# Patient Record
Sex: FEMALE | Race: WHITE
[De-identification: names, ages, dates, MRNs, and addresses within clinical notes are randomized per-mention and may not be internally consistent; named-entity substitution may affect disease eponyms.]

---

## 2018-01-07 ENCOUNTER — HEALTH MAINTENANCE LETTER (OUTPATIENT)
Age: 38
End: 2018-01-07

## 2018-02-05 ENCOUNTER — RECORDS - HEALTHEAST (OUTPATIENT)
Dept: ADMINISTRATIVE | Facility: OTHER | Age: 38
End: 2018-02-05

## 2018-02-28 ENCOUNTER — APPOINTMENT (OUTPATIENT)
Dept: CT IMAGING | Facility: CLINIC | Age: 38
End: 2018-02-28
Attending: FAMILY MEDICINE
Payer: COMMERCIAL

## 2018-02-28 ENCOUNTER — HOSPITAL ENCOUNTER (EMERGENCY)
Facility: CLINIC | Age: 38
Discharge: HOME | End: 2018-02-28
Attending: FAMILY MEDICINE | Admitting: FAMILY MEDICINE
Payer: COMMERCIAL

## 2018-02-28 VITALS
RESPIRATION RATE: 16 BRPM | HEIGHT: 71 IN | SYSTOLIC BLOOD PRESSURE: 103 MMHG | HEART RATE: 66 BPM | OXYGEN SATURATION: 100 % | DIASTOLIC BLOOD PRESSURE: 66 MMHG | TEMPERATURE: 98.5 F

## 2018-02-28 DIAGNOSIS — R91.1 PULMONARY NODULE: ICD-10-CM

## 2018-02-28 DIAGNOSIS — R10.9 ABDOMINAL PAIN, UNSPECIFIED ABDOMINAL LOCATION: ICD-10-CM

## 2018-02-28 DIAGNOSIS — R11.0 NAUSEA: ICD-10-CM

## 2018-02-28 LAB
ALBUMIN SERPL-MCNC: 4 G/DL (ref 3.4–5)
ALBUMIN UR-MCNC: NEGATIVE MG/DL
ALP SERPL-CCNC: 42 U/L (ref 40–150)
ALT SERPL W P-5'-P-CCNC: 15 U/L (ref 0–50)
ANION GAP SERPL CALCULATED.3IONS-SCNC: 8 MMOL/L (ref 3–14)
APPEARANCE UR: CLEAR
AST SERPL W P-5'-P-CCNC: 17 U/L (ref 0–45)
BASOPHILS # BLD AUTO: 0 10E9/L (ref 0–0.2)
BASOPHILS NFR BLD AUTO: 0.5 %
BILIRUB SERPL-MCNC: 0.6 MG/DL (ref 0.2–1.3)
BILIRUB UR QL STRIP: NEGATIVE
BUN SERPL-MCNC: 11 MG/DL (ref 7–30)
CALCIUM SERPL-MCNC: 8.6 MG/DL (ref 8.5–10.1)
CHLORIDE SERPL-SCNC: 107 MMOL/L (ref 94–109)
CO2 SERPL-SCNC: 23 MMOL/L (ref 20–32)
COLOR UR AUTO: YELLOW
CREAT SERPL-MCNC: 0.88 MG/DL (ref 0.52–1.04)
DIFFERENTIAL METHOD BLD: NORMAL
EOSINOPHIL # BLD AUTO: 0.2 10E9/L (ref 0–0.7)
EOSINOPHIL NFR BLD AUTO: 3.7 %
ERYTHROCYTE [DISTWIDTH] IN BLOOD BY AUTOMATED COUNT: 12.5 % (ref 10–15)
GFR SERPL CREATININE-BSD FRML MDRD: 71 ML/MIN/1.7M2
GLUCOSE SERPL-MCNC: 81 MG/DL (ref 70–99)
GLUCOSE UR STRIP-MCNC: NEGATIVE MG/DL
HCT VFR BLD AUTO: 38.6 % (ref 35–47)
HGB BLD-MCNC: 13.3 G/DL (ref 11.7–15.7)
HGB UR QL STRIP: ABNORMAL
IMM GRANULOCYTES # BLD: 0 10E9/L (ref 0–0.4)
IMM GRANULOCYTES NFR BLD: 0.2 %
KETONES UR STRIP-MCNC: 20 MG/DL
LEUKOCYTE ESTERASE UR QL STRIP: NEGATIVE
LIPASE SERPL-CCNC: 157 U/L (ref 73–393)
LYMPHOCYTES # BLD AUTO: 2.3 10E9/L (ref 0.8–5.3)
LYMPHOCYTES NFR BLD AUTO: 38.6 %
MCH RBC QN AUTO: 29.8 PG (ref 26.5–33)
MCHC RBC AUTO-ENTMCNC: 34.5 G/DL (ref 31.5–36.5)
MCV RBC AUTO: 86 FL (ref 78–100)
MONOCYTES # BLD AUTO: 0.4 10E9/L (ref 0–1.3)
MONOCYTES NFR BLD AUTO: 6.8 %
MUCOUS THREADS #/AREA URNS LPF: PRESENT /LPF
NEUTROPHILS # BLD AUTO: 3 10E9/L (ref 1.6–8.3)
NEUTROPHILS NFR BLD AUTO: 50.2 %
NITRATE UR QL: NEGATIVE
PH UR STRIP: 5 PH (ref 5–7)
PLATELET # BLD AUTO: 245 10E9/L (ref 150–450)
POTASSIUM SERPL-SCNC: 3.4 MMOL/L (ref 3.4–5.3)
PROT SERPL-MCNC: 7.6 G/DL (ref 6.8–8.8)
RBC # BLD AUTO: 4.47 10E12/L (ref 3.8–5.2)
RBC #/AREA URNS AUTO: 3 /HPF (ref 0–2)
SODIUM SERPL-SCNC: 138 MMOL/L (ref 133–144)
SOURCE: ABNORMAL
SP GR UR STRIP: 1.02 (ref 1–1.03)
SQUAMOUS #/AREA URNS AUTO: 4 /HPF (ref 0–1)
UROBILINOGEN UR STRIP-MCNC: 0 MG/DL (ref 0–2)
WBC # BLD AUTO: 6 10E9/L (ref 4–11)
WBC #/AREA URNS AUTO: 3 /HPF (ref 0–2)

## 2018-02-28 PROCEDURE — 99285 EMERGENCY DEPT VISIT HI MDM: CPT | Mod: 25 | Performed by: FAMILY MEDICINE

## 2018-02-28 PROCEDURE — 25000128 H RX IP 250 OP 636: Performed by: FAMILY MEDICINE

## 2018-02-28 PROCEDURE — 99285 EMERGENCY DEPT VISIT HI MDM: CPT | Mod: 25

## 2018-02-28 PROCEDURE — 85025 COMPLETE CBC W/AUTO DIFF WBC: CPT | Performed by: FAMILY MEDICINE

## 2018-02-28 PROCEDURE — 93005 ELECTROCARDIOGRAM TRACING: CPT

## 2018-02-28 PROCEDURE — 87086 URINE CULTURE/COLONY COUNT: CPT | Performed by: FAMILY MEDICINE

## 2018-02-28 PROCEDURE — 81001 URINALYSIS AUTO W/SCOPE: CPT | Performed by: FAMILY MEDICINE

## 2018-02-28 PROCEDURE — 74177 CT ABD & PELVIS W/CONTRAST: CPT

## 2018-02-28 PROCEDURE — 25000125 ZZHC RX 250: Performed by: FAMILY MEDICINE

## 2018-02-28 PROCEDURE — 80053 COMPREHEN METABOLIC PANEL: CPT | Performed by: FAMILY MEDICINE

## 2018-02-28 PROCEDURE — 93010 ELECTROCARDIOGRAM REPORT: CPT | Mod: Z6 | Performed by: FAMILY MEDICINE

## 2018-02-28 PROCEDURE — 83690 ASSAY OF LIPASE: CPT | Performed by: FAMILY MEDICINE

## 2018-02-28 RX ORDER — IOPAMIDOL 755 MG/ML
100 INJECTION, SOLUTION INTRAVASCULAR ONCE
Status: COMPLETED | OUTPATIENT
Start: 2018-02-28 | End: 2018-02-28

## 2018-02-28 RX ADMIN — SODIUM CHLORIDE 60 ML: 9 INJECTION, SOLUTION INTRAVENOUS at 21:17

## 2018-02-28 RX ADMIN — IOPAMIDOL 80 ML: 755 INJECTION, SOLUTION INTRAVENOUS at 21:17

## 2018-02-28 NOTE — ED AVS SNAPSHOT
[facility] Emergency Department    [address] [address]    Phone:  [phone]    Fax:  [phone]                                       Brenda Bundy   MRN: [id number]    Department:  [facility] Emergency Department   Date of Visit:  2/28/2018           Patient Information     Date Of Birth          1980        Your diagnoses for this visit were:     Abdominal pain, unspecified abdominal location epigastric, luq, ruq described    Nausea     Pulmonary nodule        You were seen by Adonay Melendez MD.        Discharge Instructions       Return to the Emergency Room if the following occurs:     Worsened pain, fever >101, vomiting/dehydration, or for any concern at anytime.    Or, follow-up with the following provider as we discussed:     Return to your GI doctor as scheduled.  Complete the HIDA as scheduled.  Talk to your doctor about the pulmonary nodule.    Medications discussed:    None new.  No changes.    If you received pain-relieving or sedating medication during your time in the ER, avoid alcohol, driving automobiles, or working with machinery.  Also, a responsible adult must stay with you.        Call the Nurse Advice Line at [phone] or [phone] for any concern at anytime.      24 Hour Appointment Hotline       To make an appointment at any [place] clinic, call [phone] ([phone]). If you don't have a family doctor or clinic, we will help you find one. [place] clinics are conveniently located to serve the needs of you and your family.             Review of your medicines      Notice     You have not been prescribed any medications.            Procedures and tests performed during your visit     Abd/pelvis CT, IV contrast only TRAUMA  / AAA    CBC with platelets, differential    Comprehensive metabolic panel    EKG 12-lead, tracing only    Lipase    Peripheral IV catheter    UA with Microscopic reflex to Culture    Urine Culture Aerobic  Bacterial      Orders Needing Specimen Collection     None      Pending Results     Date and Time Order Name Status Description    2/28/2018 2219 Urine Culture Aerobic Bacterial In process             Pending Culture Results     Date and Time Order Name Status Description    2/28/2018 2219 Urine Culture Aerobic Bacterial In process             Pending Results Instructions     If you had any lab results that were not finalized at the time of your Discharge, you can call the ED Lab Result RN at [phone]. You will be contacted by this team for any positive Lab results or changes in treatment. The nurses are available 7 days a week from 10A to 6:30P.  You can leave a message 24 hours per day and they will return your call.        Test Results From Your Hospital Stay        2/28/2018  8:32 PM      Component Results     Component Value Ref Range & Units Status    WBC 6.0 4.0 - 11.0 10e9/L Final    RBC Count 4.47 3.8 - 5.2 10e12/L Final    Hemoglobin 13.3 11.7 - 15.7 g/dL Final    Hematocrit 38.6 35.0 - 47.0 % Final    MCV 86 78 - 100 fl Final    MCH 29.8 26.5 - 33.0 pg Final    MCHC 34.5 31.5 - 36.5 g/dL Final    RDW 12.5 10.0 - 15.0 % Final    Platelet Count 245 150 - 450 10e9/L Final    Diff Method Automated Method  Final    % Neutrophils 50.2 % Final    % Lymphocytes 38.6 % Final    % Monocytes 6.8 % Final    % Eosinophils 3.7 % Final    % Basophils 0.5 % Final    % Immature Granulocytes 0.2 % Final    Absolute Neutrophil 3.0 1.6 - 8.3 10e9/L Final    Absolute Lymphocytes 2.3 0.8 - 5.3 10e9/L Final    Absolute Monocytes 0.4 0.0 - 1.3 10e9/L Final    Absolute Eosinophils 0.2 0.0 - 0.7 10e9/L Final    Absolute Basophils 0.0 0.0 - 0.2 10e9/L Final    Abs Immature Granulocytes 0.0 0 - 0.4 10e9/L Final         2/28/2018  8:44 PM      Component Results     Component Value Ref Range & Units Status    Sodium 138 133 - 144 mmol/L Final    Potassium 3.4 3.4 - 5.3 mmol/L Final    Chloride 107 94 - 109 mmol/L Final    Carbon  Dioxide 23 20 - 32 mmol/L Final    Anion Gap 8 3 - 14 mmol/L Final    Glucose 81 70 - 99 mg/dL Final    Urea Nitrogen 11 7 - 30 mg/dL Final    Creatinine 0.88 0.52 - 1.04 mg/dL Final    GFR Estimate 71 >60 mL/min/1.7m2 Final    Non  GFR Calc    GFR Estimate If Black 86 >60 mL/min/1.7m2 Final    African American GFR Calc    Calcium 8.6 8.5 - 10.1 mg/dL Final    Bilirubin Total 0.6 0.2 - 1.3 mg/dL Final    Albumin 4.0 3.4 - 5.0 g/dL Final    Protein Total 7.6 6.8 - 8.8 g/dL Final    Alkaline Phosphatase 42 40 - 150 U/L Final    ALT 15 0 - 50 U/L Final    AST 17 0 - 45 U/L Final         2/28/2018  8:42 PM      Component Results     Component Value Ref Range & Units Status    Lipase 157 73 - 393 U/L Final         2/28/2018  9:26 PM      Component Results     Component Value Ref Range & Units Status    Color Urine Yellow  Final    Appearance Urine Clear  Final    Glucose Urine Negative NEG^Negative mg/dL Final    Bilirubin Urine Negative NEG^Negative Final    Ketones Urine 20 (A) NEG^Negative mg/dL Final    Specific Gravity Urine 1.020 1.003 - 1.035 Final    Blood Urine Moderate (A) NEG^Negative Final    pH Urine 5.0 5.0 - 7.0 pH Final    Protein Albumin Urine Negative NEG^Negative mg/dL Final    Urobilinogen mg/dL 0.0 0.0 - 2.0 mg/dL Final    Nitrite Urine Negative NEG^Negative Final    Leukocyte Esterase Urine Negative NEG^Negative Final    Source Midstream Urine  Final    WBC Urine 3 (H) 0 - 2 /HPF Final    RBC Urine 3 (H) 0 - 2 /HPF Final    Squamous Epithelial /HPF Urine 4 (H) 0 - 1 /HPF Final    Mucous Urine Present (A) NEG^Negative /LPF Final         2/28/2018  9:39 PM      Narrative     CT ABDOMEN AND PELVIS WITH CONTRAST 2/28/2018 9:19 PM     HISTORY: LUQ and epigastric pain.      TECHNIQUE: Axial images from the lung bases to the symphysis are  performed with additional coronal reformatted images. 80 mL of Isovue  370 are given intravenously.  Radiation dose for this scan was reduced  using  automated exposure control, adjustment of the mA and/or kV  according to patient size, or iterative reconstruction technique.    FINDINGS:     A 0.3 cm left lower lobe lung nodule is present on series 2, image 13.  Lung bases are otherwise clear.    Abdomen: The liver, spleen, gallbladder, pancreas, adrenal glands and  right kidney are unremarkable. Small cysts are noted in the left  kidney. No hydronephrosis or renal calculi. No enlarged lymph nodes.  The bowel is normal in caliber without obstruction or diverticulitis.  Appendix is normal.    Pelvis: The bladder is decompressed. The uterus and adnexal regions  are within normal limits. Corpus luteal cyst left ovary is present on  image 66 of series 2 which is a normal physiologic finding. The rectum  is unremarkable. No enlarged pelvic lymph nodes or free fluid.  Bilateral fallopian tube occlusion devices are visualized. Bone window  examination is unremarkable.        Impression     IMPRESSION:  1. No acute changes in the abdomen or pelvis to account for patient's  symptoms. No bowel obstruction, diverticulitis or appendicitis.  2. Tiny left lung base nodule is indeterminate.    Recommendations for one or multiple incidental lung nodules < or = 6mm  :    Low risk patients: No routine follow-up.    High risk patients: Optional follow-up CT at 12 months; if  unchanged, no further follow-up.    *Low Risk: Minimal or absent history of smoking or other known risk  factors.  *Nonsolid (ground glass) or partly solid nodules may require longer  follow-up to exclude indolent adenocarcinoma.  *Recommendations based on Guidelines for the Management of Incidental  Pulmonary Nodules Detected at CT: From the Fleischner Society 2017,  Radiology 2017.    SCOTT HERRERA MD         2/28/2018 10:29 PM                Thank you for choosing [facility]       Thank you for choosing [facility] for your care. Our goal is always to provide you with excellent care. Hearing back from our patients  "is one way we can continue to improve our services. Please take a few minutes to complete the written survey that you may receive in the mail after you visit with us. Thank you!        [employer] Information     [employer] lets you send messages to your doctor, view your test results, renew your prescriptions, schedule appointments and more. To sign up, go to [email or web address] . Click on \"Log in\" on the left side of the screen, which will take you to the Welcome page. Then click on \"Sign up Now\" on the right side of the page.     You will be asked to enter the access code listed below, as well as some personal information. Please follow the directions to create your username and password.     Your access code is: [email or web address]  Expires: 2018 10:54 PM     Your access code will  in 90 days. If you need help or a new code, please call your [place] clinic or [phone].        Care EveryWhere ID     This is your Care EveryWhere ID. This could be used by other organizations to access your [place] medical records  [id number]        Equal Access to Services     ROMY MEEHAN : Hademeka Adame, george montero, deana osborn, imelda zepeda. So [facility] [phone].    ATENCIÓN: Si habla español, tiene a shannon disposición servicios gratuitos de asistencia lingüística. Mauricio al [phone].    We comply with applicable federal civil rights laws and [place] laws. We do not discriminate on the basis of race, color, national origin, age, disability, sex, sexual orientation, or gender identity.            After Visit Summary       This is your record. Keep this with you and show to your community pharmacist(s) and doctor(s) at your next visit.                  "

## 2018-02-28 NOTE — ED AVS SNAPSHOT
[facility] Emergency Department    [address] [address]    Phone:  [phone]    Fax:  [phone]                                       Brenda Bundy   MRN: [id number]    Department:  [facility] Emergency Department   Date of Visit:  2/28/2018           After Visit Summary Signature Page     I have received my discharge instructions, and my questions have been answered. I have discussed any challenges I see with this plan with the nurse or doctor.    ..........................................................................................................................................  Patient/Patient Representative Signature      ..........................................................................................................................................  Patient Representative Print Name and Relationship to Patient    ..................................................               ................................................  Date                                            Time    ..........................................................................................................................................  Reviewed by Signature/Title    ...................................................              ..............................................  Date                                                            Time

## 2018-03-01 LAB
BACTERIA SPEC CULT: NO GROWTH
Lab: NORMAL
SPECIMEN SOURCE: NORMAL

## 2018-03-01 NOTE — DISCHARGE INSTRUCTIONS
Return to the Emergency Room if the following occurs:     Worsened pain, fever >101, vomiting/dehydration, or for any concern at anytime.    Or, follow-up with the following provider as we discussed:     Return to your GI doctor as scheduled.  Complete the HIDA as scheduled.  Talk to your doctor about the pulmonary nodule.    Medications discussed:    None new.  No changes.    If you received pain-relieving or sedating medication during your time in the ER, avoid alcohol, driving automobiles, or working with machinery.  Also, a responsible adult must stay with you.        Call the Nurse Advice Line at [phone] or [phone] for any concern at anytime.

## 2018-03-01 NOTE — ED NOTES
Pt here with LUQ abdominal pain started yesterday- getting worse throughout the day and also having mid back pain radiating to her shoulder blades. Pt sees a GI MD thru [facility] for her abdominal pain for the past 6 weeks- have not been able to find a source for the pain. Pt took Aleve yesterday with no relief. Pain worse after eating, feels nauseated, no vomiting, diarrhea or constipation.

## 2018-03-01 NOTE — ED PROVIDER NOTES
"HPI  Patient is a 37-year-old female presenting with left upper quadrant abdominal pain.  She denies any prior abdominal surgery.  She has had a tubal procedure for contraception.  She has been seeing GI over the past 6 weeks for epigastric and left upper quadrant pain.  She had an upper endoscopy which was apparently unremarkable.  She had an abdominal ultrasound which was unremarkable.  She has a CT scan of her abdomen and pelvis and a HIDA scan scheduled for Monday, 5 days from now.  She does not take medication on a regular basis.    The patient describes occasional epigastric and left upper quadrant abdominal pain that seems to come on with food intake.  It started initially with nausea weeks ago and progressed to pain.  The frequency, severity, and duration of her pain has progressively worsened with time.  She typically experiences pain for an hour or so before goes away.  Yesterday afternoon the pain came on and has not gone away.  She awoke this morning with pain which is very unusual for her.  The pain currently is rated 5/10.  It is been constant throughout the day.  She has had a decreased appetite and nausea off and on.  No vomiting.  She felt some chills this afternoon but no objective fever identified.  She denies constipation.  She denies diarrhea.  No hematochezia or melena.  No dysuria, urgency, frequency, or hematuria.  No vaginal discharge or irritation.  No trauma or injury.  No cough or shortness of breath.    ROS: All other review of systems are negative other than that noted above.     No past medical history on file.  No past surgical history on file.  No family history on file.  Social History   Substance Use Topics     Smoking status: Not on file     Smokeless tobacco: Not on file     Alcohol use Not on file         PHYSICAL  /84  Pulse 66  Temp 98.5  F (36.9  C) (Oral)  Resp 16  Ht 1.803 m (5' 11\")  SpO2 100%  General: Patient is alert and in moderate distress.  Frustrated.  " Fit.  Neurological: Alert.  Moving upper and lower extremities equally, bilaterally.  Head / Neck: Atraumatic.  Ears: Not done.  Eyes: Pupils are equal, round, and reactive.  Normal conjunctiva.  Nose: Midline.  No epistaxis.  Mouth / Throat: No ulcerations or lesions.  Upper pharynx is not erythematous.  Moist.  Respiratory: No respiratory distress. CTA B.  Cardiovascular: Regular rhythm.  Peripheral extremities are warm.  No edema.  No calf tenderness.  Abdomen / Pelvis: Tender in the epigastrium and left upper quadrant.  No distention.  Soft throughout.  Genitalia: Not done.  Musculoskeletal: No tenderness over major muscles and joints.  Skin: No evidence of rash or trauma.        ED COURSE  2030.  Patient has epigastric and left upper quadrant pain.  It does radiate into the left mid back as well.  Lab values pending.  She refuses analgesia or antiemetic.  We will likely order a CT scan but I will await results to clarify the type of scan.    Labs Ordered and Resulted from Time of ED Arrival Up to the Time of Departure from the ED   ROUTINE UA WITH MICROSCOPIC REFLEX TO CULTURE - Abnormal; Notable for the following:        Result Value    Ketones Urine 20 (*)     Blood Urine Moderate (*)     WBC Urine 3 (*)     RBC Urine 3 (*)     Squamous Epithelial /HPF Urine 4 (*)     Mucous Urine Present (*)     All other components within normal limits   CBC WITH PLATELETS DIFFERENTIAL   COMPREHENSIVE METABOLIC PANEL   LIPASE   PERIPHERAL IV CATHETER   URINE CULTURE AEROBIC BACTERIAL     IMAGING  Images reviewed by me.  Radiology report also reviewed.  Abd/pelvis CT, IV contrast only TRAUMA  / AAA   Final Result   IMPRESSION:   1. No acute changes in the abdomen or pelvis to account for patient's   symptoms. No bowel obstruction, diverticulitis or appendicitis.   2. Tiny left lung base nodule is indeterminate.      Recommendations for one or multiple incidental lung nodules < or = 6mm   :     Low risk patients: No routine  follow-up.     High risk patients: Optional follow-up CT at 12 months; if   unchanged, no further follow-up.      *Low Risk: Minimal or absent history of smoking or other known risk   factors.   *Nonsolid (ground glass) or partly solid nodules may require longer   follow-up to exclude indolent adenocarcinoma.   *Recommendations based on Guidelines for the Management of Incidental   Pulmonary Nodules Detected at CT: From the Fleischner Society 2017,   Radiology 2017.      SCOTT HERRERA MD        2860.  CT imaging shows no evidence of acute change.  They do describe a pulmonary nodule on the left.  The patient does not smoke.  No family history of early lung cancer.  No family history of early heart disease.  No high cholesterol.  No hypertension.  Low concern for acute coronary syndrome.  EKG pending.  If unremarkable, no troponin will be added.  No personal history or family history of blood dyscrasias described.  No shortness of breath.  No tachycardia.  Low concern for pulmonary embolism.  She does note that her symptoms started right about the time she initiated Topamax for migraine prophylaxis.  The dose of this medication was cut in half and her symptoms were not improved.  Further follow up with her doctor is recommended.  Complete the HIDA scan next week as scheduled.    EKG  (6801)   Interpretation performed by me.  Rate: 58     Rhythm: sinus     Axis: nl  Intervals: NM (12-2) 156, QRS (<12) 87, QTc (>5) 409  P wave: nl     QRS complex: nl  ST segment / T-wave: nl  Conclusion: nl      IMPRESSION    ICD-10-CM    1. Abdominal pain, unspecified abdominal location R10.9     epigastric, luq, ruq described   2. Nausea R11.0    3. Pulmonary nodule R91.1            Critical Care time:  none         Adonay Melendez MD  02/28/18 7370

## 2020-02-24 ENCOUNTER — HEALTH MAINTENANCE LETTER (OUTPATIENT)
Age: 40
End: 2020-02-24

## 2020-12-13 ENCOUNTER — HEALTH MAINTENANCE LETTER (OUTPATIENT)
Age: 40
End: 2020-12-13

## 2021-04-17 ENCOUNTER — HEALTH MAINTENANCE LETTER (OUTPATIENT)
Age: 41
End: 2021-04-17

## 2021-09-26 ENCOUNTER — HEALTH MAINTENANCE LETTER (OUTPATIENT)
Age: 41
End: 2021-09-26

## 2022-05-08 ENCOUNTER — HEALTH MAINTENANCE LETTER (OUTPATIENT)
Age: 42
End: 2022-05-08

## 2023-04-23 ENCOUNTER — HEALTH MAINTENANCE LETTER (OUTPATIENT)
Age: 43
End: 2023-04-23

## 2023-06-02 ENCOUNTER — HEALTH MAINTENANCE LETTER (OUTPATIENT)
Age: 43
End: 2023-06-02

## 2024-02-11 ENCOUNTER — HEALTH MAINTENANCE LETTER (OUTPATIENT)
Age: 44
End: 2024-02-11